# Patient Record
Sex: MALE | Race: WHITE | NOT HISPANIC OR LATINO | URBAN - METROPOLITAN AREA
[De-identification: names, ages, dates, MRNs, and addresses within clinical notes are randomized per-mention and may not be internally consistent; named-entity substitution may affect disease eponyms.]

---

## 2017-04-07 ENCOUNTER — OFFICE VISIT - RIVER FALLS (OUTPATIENT)
Dept: FAMILY MEDICINE | Facility: CLINIC | Age: 42
End: 2017-04-07

## 2017-04-07 ASSESSMENT — MIFFLIN-ST. JEOR: SCORE: 1934.99

## 2017-08-06 ENCOUNTER — OFFICE VISIT - RIVER FALLS (OUTPATIENT)
Dept: FAMILY MEDICINE | Facility: CLINIC | Age: 42
End: 2017-08-06

## 2022-02-11 VITALS
SYSTOLIC BLOOD PRESSURE: 128 MMHG | HEART RATE: 72 BPM | TEMPERATURE: 97.7 F | BODY MASS INDEX: 30.16 KG/M2 | DIASTOLIC BLOOD PRESSURE: 70 MMHG | WEIGHT: 222.4 LBS

## 2022-02-11 VITALS
DIASTOLIC BLOOD PRESSURE: 68 MMHG | RESPIRATION RATE: 16 BRPM | TEMPERATURE: 98.6 F | BODY MASS INDEX: 30.07 KG/M2 | SYSTOLIC BLOOD PRESSURE: 108 MMHG | HEIGHT: 72 IN | WEIGHT: 222 LBS | HEART RATE: 64 BPM

## 2022-02-16 NOTE — NURSING NOTE
Pt preop Px faxed to [facility] [phone]. COnfirmation received. Crow Celestin CMA

## 2022-02-16 NOTE — PROGRESS NOTES
Patient:   MICHELLE SILVA            MRN: [id number]            FIN: [id number]               Age:   41 years     Sex:  Male     :  1975   Associated Diagnoses:   Pre-operative examination   Author:   George Moreno PA-C      Preoperative Information   Procedure/ Case: vasectomy   Location scheduled: [facility]   Date/ Time scheduled: 2017 8:00:00 AM   surgeon= Dr Monroy   Indication for surgery:  patient had a vasectomy 2016, semen analysis showed remaining sperm, now scheduled for re-do vasectomy.       Chief Complaint   2017 3:12 PM CDT     Pt here for Preop Px for Vasectomy at [facility] by Dr Monroy. DOS       Review of Systems   Constitutional:  Negative.    Ear/Nose/Mouth/Throat:  Negative.    Respiratory:  Negative.    Cardiovascular:  Negative.    Gastrointestinal:  Negative.       Health Status   Allergies:    Allergic Reactions (Selected)  No known allergies   Medications:  (Selected)   , not on any regular medications   Problem list:    All Problems  Obesity / ICD-9-.00 / Probable      Histories   Past Medical History:    Active  Obesity (278.00)   Family History:    AAA - Abdominal aortic aneurysm  Grandmother (M)     Procedure history:    No active procedure history items have been selected or recorded.   Social History:        Tobacco Assessment            Never       Has  no history of anemia.  Has  no history of DVT or pulmonary embolism.  Has  no personal history of bleeding problems.   Has  no personal history of anesthesia reactions.  Has no  personal history of sleep apnea  Patient  does not have active tuberculosis.    S/he  has not taken aspirin or aspirin-containing products in the last week.     S/he  has not taken Plavix (Clopidogrel) in the last 2 weeks.    S/he  has not taken warfarin in the past week.    S/he has not been on corticosteroids for more than 2 weeks recently.   S/he is not DNR before, during or after surgery.          Chest pain  / SOB walking up 2 flights of steps? No  Pain in neck or jaw? No  CAD MI?  No  Afib? No  Heart Failure?No  Asthma  or Bronchitis? No  Diabetes? No       Insulin/Orals?   Seizure Disorder? No  CKD?No  Thyroid Disease?No  Liver Disease?No  CVA?No      Physical Examination   Vital Signs   4/7/2017 3:12 PM CDT Temperature Tympanic 98.6 DegF    Peripheral Pulse Rate 64 bpm    Pulse Site Radial artery    Respiratory Rate 16 br/min    Systolic Blood Pressure 108 mmHg    Diastolic Blood Pressure 68 mmHg    Mean Arterial Pressure 81 mmHg    BP Site Right arm      Measurements from flowsheet : Measurements   4/7/2017 3:12 PM CDT Height Measured - Standard 72 in    Weight Measured - Standard 222 lb    BSA 2.26 m2    Body Mass Index 30.11 kg/m2      General:  No acute distress.    Eye:  Pupils are equal, round and reactive to light, Extraocular movements are intact.    HENT:  Tympanic membranes are clear, No pharyngeal erythema, No sinus tenderness.    Neck:  Supple, Non-tender, No lymphadenopathy.    Respiratory:  Lungs are clear to auscultation.    Cardiovascular:  Normal rate, Regular rhythm, No murmur.    Gastrointestinal:  Soft, Non-tender, Non-distended, Normal bowel sounds, No organomegaly.    Musculoskeletal:  Normal range of motion.    Neurologic:  Cranial Nerves II-XII are grossly intact, Normal deep tendon reflexes.    Psychiatric:  Appropriate mood & affect.       Impression and Plan   Diagnosis     Pre-operative examination ([id number]-UP Z01.818).     Orders     Orders   Charges (Evaluation and Management):  56055 office outpatient visit 25 minutes (Charge) (Order): Quantity: 1, Pre-operative examination.     Approved for surgery without restrictions.

## 2022-02-16 NOTE — PROGRESS NOTES
Patient:   MICHELLE SILVA            MRN: [id number]            FIN: [id number]               Age:   42 years     Sex:  Male     :  1975   Associated Diagnoses:   Bee sting   Author:   Gricel Greco      Visit Information      Date of Service: 2017 01:26 pm  Performing Location: [facility]  Encounter#: [id number]      Primary Care Provider (PCP):  Vinnie Price MD, NPI# [id number]      Referring Provider:  Gricel Greco    NPI# [id number]      Chief Complaint   2017 1:30 PM CDT     Stung by bee on right eye lid and right wirst.        History of Present Illness   Confirmed symptoms and concerns with patient as presented in CC above. Stung approx 1 hour ago. Momentarily felt tight back and chest but that has resolved.  Used some ice but no antihistamine yet  No history of bee allergy      Review of Systems            Health Status   Allergies:    Allergic Reactions (Selected)  No known allergies   Medications:  (Selected)      Problem list:    All Problems  Obesity / ICD-9-.00 / Probable      Histories   Past Medical History:    Active  Obesity (278.00)   Family History:    AAA - Abdominal aortic aneurysm  Grandmother (M)     Procedure history:    No active procedure history items have been selected or recorded.   Social History:        Tobacco Assessment            Never        Physical Examination   Vital Signs   2017 1:30 PM CDT Temperature Tympanic 97.7 DegF  LOW    Peripheral Pulse Rate 72 bpm    Systolic Blood Pressure 128 mmHg    Diastolic Blood Pressure 70 mmHg    Mean Arterial Pressure 89 mmHg      Measurements from flowsheet : Measurements   2017 1:30 PM CDT     Weight Measured - Standard                222.4 lb     General:  Alert and oriented, No acute distress, right upper eye lid a little swollen, minimal swelling at right wrist.    Respiratory:  Lungs are clear to auscultation, Respirations are non-labored, Breath sounds are equal.     Cardiovascular:  Normal rate, Regular rhythm, No murmur.    Integumentary:  Warm, Dry, Pink.    Neurologic:  Alert, Oriented, Normal sensory.       Impression and Plan   Diagnosis     Bee sting ([id number]).     Patient Instructions:       Counseled: Patient, Regarding diagnosis, Regarding treatment, Regarding medications, Verbalized understanding, Counseled on symptomatic management. Return to clinic for re evaluation if worsening, simply not improving, or failure to resolve.   home-- benadryl   now  and every 6 hours, zyrtec 10mg, ice to areas, rtc if signs of worsening.